# Patient Record
Sex: MALE | Race: WHITE | NOT HISPANIC OR LATINO | URBAN - METROPOLITAN AREA
[De-identification: names, ages, dates, MRNs, and addresses within clinical notes are randomized per-mention and may not be internally consistent; named-entity substitution may affect disease eponyms.]

---

## 2021-08-04 ENCOUNTER — APPOINTMENT (OUTPATIENT)
Dept: RADIOLOGY | Facility: IMAGING CENTER | Age: 21
End: 2021-08-04
Attending: NURSE PRACTITIONER
Payer: COMMERCIAL

## 2021-08-04 ENCOUNTER — OFFICE VISIT (OUTPATIENT)
Dept: URGENT CARE | Facility: CLINIC | Age: 21
End: 2021-08-04

## 2021-08-04 VITALS
HEIGHT: 74 IN | BODY MASS INDEX: 22.46 KG/M2 | WEIGHT: 175 LBS | RESPIRATION RATE: 16 BRPM | OXYGEN SATURATION: 97 % | DIASTOLIC BLOOD PRESSURE: 76 MMHG | TEMPERATURE: 98.5 F | HEART RATE: 71 BPM | SYSTOLIC BLOOD PRESSURE: 126 MMHG

## 2021-08-04 DIAGNOSIS — J02.9 SORE THROAT: ICD-10-CM

## 2021-08-04 DIAGNOSIS — R07.89 LEFT-SIDED CHEST WALL PAIN: ICD-10-CM

## 2021-08-04 DIAGNOSIS — M94.0 COSTOCHONDRITIS: ICD-10-CM

## 2021-08-04 PROBLEM — G56.21 NEURITIS OF RIGHT ULNAR NERVE: Status: ACTIVE | Noted: 2020-03-02

## 2021-08-04 PROBLEM — M25.521 ARTHRALGIA OF RIGHT ELBOW: Status: ACTIVE | Noted: 2020-03-02

## 2021-08-04 PROBLEM — I80.9 SUPERFICIAL THROMBOPHLEBITIS: Status: ACTIVE | Noted: 2020-03-02

## 2021-08-04 LAB
INT CON NEG: NORMAL
INT CON POS: NORMAL
S PYO AG THROAT QL: NORMAL

## 2021-08-04 PROCEDURE — 87880 STREP A ASSAY W/OPTIC: CPT | Performed by: NURSE PRACTITIONER

## 2021-08-04 PROCEDURE — 99214 OFFICE O/P EST MOD 30 MIN: CPT | Performed by: NURSE PRACTITIONER

## 2021-08-04 PROCEDURE — 71046 X-RAY EXAM CHEST 2 VIEWS: CPT | Mod: TC,FY | Performed by: NURSE PRACTITIONER

## 2021-08-04 RX ORDER — DICLOFENAC SODIUM 75 MG/1
TABLET, DELAYED RELEASE ORAL
COMMUNITY
End: 2021-08-04

## 2021-08-04 RX ORDER — OXYCODONE HYDROCHLORIDE 5 MG/1
TABLET ORAL
COMMUNITY
End: 2021-08-04

## 2021-08-04 RX ORDER — NAPROXEN 500 MG/1
500 TABLET ORAL 2 TIMES DAILY WITH MEALS
Qty: 60 TABLET | Refills: 0 | Status: SHIPPED | OUTPATIENT
Start: 2021-08-04

## 2021-08-04 ASSESSMENT — ENCOUNTER SYMPTOMS
EYES NEGATIVE: 1
MUSCULOSKELETAL NEGATIVE: 1
CONSTITUTIONAL NEGATIVE: 1
RESPIRATORY NEGATIVE: 1
SORE THROAT: 1
PSYCHIATRIC NEGATIVE: 1
NEUROLOGICAL NEGATIVE: 1
GASTROINTESTINAL NEGATIVE: 1

## 2021-08-05 NOTE — PROGRESS NOTES
"Subjective:   Jose Rod is a 21 y.o. male who presents for Sore Throat (x2 wk ) and Shortness of Breath (lt lung feels like something is in there )       Pharyngitis   This is a new problem. The current episode started in the past 7 days. The problem has been unchanged. The pain is worse on the right side. There has been no fever. The pain is mild. He has tried cool liquids and NSAIDs for the symptoms.   Other  This is a new (left sided chest wall pain) problem. The current episode started 1 to 4 weeks ago (2 weeks). The problem occurs intermittently. The problem has been waxing and waning. Associated symptoms include chest pain (chest wall) and a sore throat. Associated symptoms comments: Left rib cage pain. Exacerbated by: smoking/vaping. He has tried NSAIDs, position changes and heat (OTC NSAIDS) for the symptoms. The treatment provided moderate relief.         Review of Systems   Constitutional: Negative.    HENT: Positive for sore throat.    Eyes: Negative.    Respiratory: Negative.    Cardiovascular: Positive for chest pain (chest wall).   Gastrointestinal: Negative.    Genitourinary: Negative.    Musculoskeletal: Negative.    Skin: Negative.    Neurological: Negative.    Psychiatric/Behavioral: Negative.    All other systems reviewed and are negative.      MEDS:   Current Outpatient Medications:   •  naproxen (NAPROSYN) 500 MG Tab, Take 1 tablet by mouth 2 times a day with meals., Disp: 60 tablet, Rfl: 0  ALLERGIES: No Known Allergies    Patient's PMH, SocHx, SurgHx, FamHx, Drug allergies and medications were reviewed.     Objective:   /76   Pulse 71   Temp 36.9 °C (98.5 °F) (Temporal)   Resp 16   Ht 1.88 m (6' 2\")   Wt 79.4 kg (175 lb)   SpO2 97%   BMI 22.47 kg/m²     Physical Exam  Vitals and nursing note reviewed.   Constitutional:       General: He is awake.      Appearance: Normal appearance. He is well-developed and normal weight.   HENT:      Head: Normocephalic and atraumatic. "      Right Ear: Tympanic membrane, ear canal and external ear normal.      Left Ear: Tympanic membrane, ear canal and external ear normal.      Nose: Nose normal.      Mouth/Throat:      Lips: Pink.      Mouth: Mucous membranes are moist.      Pharynx: Oropharynx is clear. Uvula midline. Posterior oropharyngeal erythema present.   Eyes:      Extraocular Movements: Extraocular movements intact.      Conjunctiva/sclera: Conjunctivae normal.      Pupils: Pupils are equal, round, and reactive to light.   Neck:      Thyroid: No thyromegaly.      Trachea: Trachea normal.   Cardiovascular:      Rate and Rhythm: Normal rate and regular rhythm.      Pulses: Normal pulses.      Heart sounds: Normal heart sounds, S1 normal and S2 normal.   Pulmonary:      Effort: Pulmonary effort is normal. No respiratory distress.      Breath sounds: Normal breath sounds and air entry. No wheezing, rhonchi or rales.   Abdominal:      General: Bowel sounds are normal.      Palpations: Abdomen is soft.   Musculoskeletal:         General: Normal range of motion.      Cervical back: Full passive range of motion without pain, normal range of motion and neck supple.   Lymphadenopathy:      Head:      Right side of head: Tonsillar adenopathy present.      Cervical: No cervical adenopathy.   Skin:     General: Skin is warm and dry.      Capillary Refill: Capillary refill takes less than 2 seconds.   Neurological:      General: No focal deficit present.      Mental Status: He is alert and oriented to person, place, and time.      Gait: Gait is intact.   Psychiatric:         Attention and Perception: Attention and perception normal.         Mood and Affect: Mood normal.         Speech: Speech normal.         Behavior: Behavior normal. Behavior is cooperative.         Thought Content: Thought content normal.         Judgment: Judgment normal.         Assessment/Plan:   Assessment    1. Costochondritis  - naproxen (NAPROSYN) 500 MG Tab; Take 1 tablet by  mouth 2 times a day with meals.  Dispense: 60 tablet; Refill: 0    2. Left-sided chest wall pain  - DX-CHEST-2 VIEWS; Future  - POCT Rapid Strep A  - naproxen (NAPROSYN) 500 MG Tab; Take 1 tablet by mouth 2 times a day with meals.  Dispense: 60 tablet; Refill: 0    3. Sore throat  - POCT Rapid Strep A    Vital signs stable at today's acute urgent care visit.  Reviewed test results that were completed in the clinic, negative for group A strep.  Chest x-ray does not show any acute cardiopulmonary process.  Discussed this with the patient and reassured him.  Likely costochondritis.  Begin medications as listed. Discussed management options (risks, benefits, and alternatives to treatment).     Advised the patient to follow-up with the primary care provider for recheck, reevaluation, and/or consideration of further management if necessary. Return to urgent care with any worsening symptoms or if there is no improvement in their current condition.  Should his symptoms continue and/or get worse, consider chest CT in future.  Red flags discussed and indications to immediately call 911 or present to the ED.  All questions were encouraged and answered to the patient's satisfaction and understanding, and they agree to the plan of care.     I personally reviewed prior external notes and test results pertinent to today's visit.  I have independently reviewed and interpreted all diagnostics ordered during this urgent care acute visit. Time spent evaluating this patient was a minimum of 30 minutes and includes preparing for visit, counseling/education, exam, evaluation, obtaining history, and ordering lab/test/procedures.      Please note that this dictation was created using voice recognition software. I have made a reasonable attempt to correct obvious errors, but I expect that there are errors of grammar and possibly content that I did not discover before finalizing the note.